# Patient Record
(demographics unavailable — no encounter records)

---

## 2025-01-22 NOTE — IMAGING
[de-identified] : Patient is in a sugar-tong splint from hospital.  With sling.  Sensation intact to all 5 digits denies numbness tingling, able to move all digits  X-rays reviewed right wrist/right forearm on x-ray disc brought in by patient from [facility] 1/20- intra-articular displaced distal radius fracture,  Ulnar styloid fracture. *****OF NOTE******-patient's name and date of birth did not match the images on the disc brought in from the hospital by patient, so they were not uploaded to our system.  Patient claims that the x-rays are hers but the date of birth and name is wrong, did not realize it until after they left the hospital.  X-ray right wrist 3 views obtained in the office today in splint from hospital intra-articular displaced distal radius fracture extending into the metaphysis ulnar styloid fracture

## 2025-01-22 NOTE — ASSESSMENT
[FreeTextEntry1] : At this time Dr. Freire was consulted via phone.  Offered patient attempt at reduction, she kindly declined.  We did discuss that even with reduction she may still benefit/require ORIF wrist given the nature of the fracture.  Patient is right-hand dominant, she is a .  She is taking ibuprofen.  The splint from the hospital was slightly high onto the hand I did cut it down to free up range of motion of her fingers.  She is going to see Dr. Freire on Tuesday for surgical consult

## 2025-01-22 NOTE — HISTORY OF PRESENT ILLNESS
[de-identified] : 26-year-old female comes in today for evaluation of her right wrist pain and injury.  Patient had a fall onto the wrist while skiing on January 20.  Went to [facility] had x-rays done was placed into a splint.  Patient is accompanied by her  today.  Taking ibuprofen.